# Patient Record
Sex: MALE | Race: BLACK OR AFRICAN AMERICAN | NOT HISPANIC OR LATINO | Employment: FULL TIME | URBAN - METROPOLITAN AREA
[De-identification: names, ages, dates, MRNs, and addresses within clinical notes are randomized per-mention and may not be internally consistent; named-entity substitution may affect disease eponyms.]

---

## 2022-11-03 DIAGNOSIS — R79.89 ELEVATED LFTS: Primary | ICD-10-CM

## 2022-11-03 DIAGNOSIS — K76.0 HEPATIC STEATOSIS: ICD-10-CM

## 2023-02-03 ENCOUNTER — OFFICE VISIT (OUTPATIENT)
Dept: GASTROENTEROLOGY | Facility: CLINIC | Age: 33
End: 2023-02-03
Payer: MEDICAID

## 2023-02-03 VITALS
RESPIRATION RATE: 16 BRPM | WEIGHT: 235 LBS | HEIGHT: 74 IN | DIASTOLIC BLOOD PRESSURE: 83 MMHG | BODY MASS INDEX: 30.16 KG/M2 | HEART RATE: 82 BPM | TEMPERATURE: 98 F | SYSTOLIC BLOOD PRESSURE: 125 MMHG | OXYGEN SATURATION: 98 %

## 2023-02-03 DIAGNOSIS — K76.0 HEPATIC STEATOSIS: ICD-10-CM

## 2023-02-03 DIAGNOSIS — R79.89 ELEVATED LFTS: ICD-10-CM

## 2023-02-03 PROCEDURE — 1159F PR MEDICATION LIST DOCUMENTED IN MEDICAL RECORD: ICD-10-PCS | Mod: CPTII,,, | Performed by: NURSE PRACTITIONER

## 2023-02-03 PROCEDURE — 3008F PR BODY MASS INDEX (BMI) DOCUMENTED: ICD-10-PCS | Mod: CPTII,,, | Performed by: NURSE PRACTITIONER

## 2023-02-03 PROCEDURE — 99204 OFFICE O/P NEW MOD 45 MIN: CPT | Mod: S$PBB,,, | Performed by: NURSE PRACTITIONER

## 2023-02-03 PROCEDURE — 1160F RVW MEDS BY RX/DR IN RCRD: CPT | Mod: CPTII,,, | Performed by: NURSE PRACTITIONER

## 2023-02-03 PROCEDURE — 3074F SYST BP LT 130 MM HG: CPT | Mod: CPTII,,, | Performed by: NURSE PRACTITIONER

## 2023-02-03 PROCEDURE — 1160F PR REVIEW ALL MEDS BY PRESCRIBER/CLIN PHARMACIST DOCUMENTED: ICD-10-PCS | Mod: CPTII,,, | Performed by: NURSE PRACTITIONER

## 2023-02-03 PROCEDURE — 99204 PR OFFICE/OUTPT VISIT, NEW, LEVL IV, 45-59 MIN: ICD-10-PCS | Mod: S$PBB,,, | Performed by: NURSE PRACTITIONER

## 2023-02-03 PROCEDURE — 3008F BODY MASS INDEX DOCD: CPT | Mod: CPTII,,, | Performed by: NURSE PRACTITIONER

## 2023-02-03 PROCEDURE — 3079F PR MOST RECENT DIASTOLIC BLOOD PRESSURE 80-89 MM HG: ICD-10-PCS | Mod: CPTII,,, | Performed by: NURSE PRACTITIONER

## 2023-02-03 PROCEDURE — 1159F MED LIST DOCD IN RCRD: CPT | Mod: CPTII,,, | Performed by: NURSE PRACTITIONER

## 2023-02-03 PROCEDURE — 3074F PR MOST RECENT SYSTOLIC BLOOD PRESSURE < 130 MM HG: ICD-10-PCS | Mod: CPTII,,, | Performed by: NURSE PRACTITIONER

## 2023-02-03 PROCEDURE — 3079F DIAST BP 80-89 MM HG: CPT | Mod: CPTII,,, | Performed by: NURSE PRACTITIONER

## 2023-02-03 PROCEDURE — 99214 OFFICE O/P EST MOD 30 MIN: CPT | Mod: PBBFAC | Performed by: NURSE PRACTITIONER

## 2023-02-03 RX ORDER — ATORVASTATIN CALCIUM 10 MG/1
10 TABLET, FILM COATED ORAL DAILY
COMMUNITY
Start: 2022-12-19

## 2023-02-03 RX ORDER — LISINOPRIL AND HYDROCHLOROTHIAZIDE 12.5; 2 MG/1; MG/1
1 TABLET ORAL 2 TIMES DAILY
COMMUNITY
Start: 2022-12-20

## 2023-02-03 NOTE — ASSESSMENT & PLAN NOTE
Laboratory results August 3, 2022 revealed triglycerides 196.0 and otherwise unremarkable lipid panel; hemoglobin A1c 5.5%; , AST 54, chloride 92, CO2 31.0, globulin 3.6, sodium 132, total protein 8.6, and otherwise unremarkable CMP.   Acute viral hepatitis panel was negative October 28, 2022.    , AST 81, and otherwise unremarkable hepatic function panel October 28, 2022.    Abdominal ultrasound August 12, 2022 revealed hepatomegaly and mild hepatic steatosis with normal exam of the gallbladder.  Lifestyle and dietary modifications provided  Recommend good control of comorbidities  Will proceed with further workup for elevated LFTs (see below)  Call with updates  F/u clinic visit with NP in 6 months

## 2023-02-03 NOTE — PROGRESS NOTES
Subjective:       Patient ID: Mary Zhang is a 32 y.o. male.    Chief Complaint: Elevated Hepatic Enzymes    This 32-year-old  male with a history of hypertension and hyperlipidemia is referred for elevated LFTs.  He presents unaccompanied.  Laboratory results August 3, 2022 revealed triglycerides 196.0 and otherwise unremarkable lipid panel; hemoglobin A1c 5.5%; , AST 54, chloride 92, CO2 31.0, globulin 3.6, sodium 132, total protein 8.6, and otherwise unremarkable CMP. Acute viral hepatitis panel was negative October 28, 2022.  , AST 81, and otherwise unremarkable hepatic function panel October 28, 2022.  Abdominal ultrasound August 12, 2022 revealed hepatomegaly and mild hepatic steatosis with normal exam of the gallbladder.    He reports feeling well and denies nocturnal symptoms, appetite changes, unintentional weight loss, fever, chills, nausea, vomiting, hematemesis, odynophagia, dysphagia, acid reflux, pyrosis, belching, bloating, early satiety, or abdominal pain.  Bowel habits are described as formed stools once daily without melena, hematochezia, fecal urgency, fecal incontinence, or pain with defecation.  He denies icterus, jaundice, pruritus, confusion, headaches, vision changes, cough, shortness of breath, chest pain, abdominal/lower extremity swelling, dark-colored urine, or pale-colored stools.    He denies ever having an EGD or colonoscopy done.  He denies regular NSAID use or use of blood thinners.  He vapes daily and drinks alcohol on occasion.  He denies illicit drug use.  He denies a family history of IBD, colon polyps, or colon cancer.    Review of patient's allergies indicates:  No Known Allergies    Past Medical History:   Diagnosis Date    HLD (hyperlipidemia)     HTN (hypertension)      History reviewed. No pertinent surgical history.    Family History:   family history is not on file.    Social History:    reports that he has been smoking vaping with  nicotine. He has never used smokeless tobacco. He reports current alcohol use of about 7.0 standard drinks per week. He reports that he does not currently use drugs.    Review of Systems  Negative except as noted in the HPI.      Objective:      Physical Exam  Constitutional:       Appearance: Normal appearance.   HENT:      Head: Normocephalic.      Mouth/Throat:      Mouth: Mucous membranes are moist.   Eyes:      Extraocular Movements: Extraocular movements intact.      Conjunctiva/sclera: Conjunctivae normal.      Pupils: Pupils are equal, round, and reactive to light.   Cardiovascular:      Rate and Rhythm: Normal rate and regular rhythm.      Pulses: Normal pulses.      Heart sounds: Normal heart sounds.   Pulmonary:      Effort: Pulmonary effort is normal.      Breath sounds: Normal breath sounds.   Abdominal:      General: Bowel sounds are normal.      Palpations: Abdomen is soft.   Musculoskeletal:         General: Normal range of motion.      Cervical back: Normal range of motion and neck supple.   Skin:     General: Skin is warm and dry.   Neurological:      General: No focal deficit present.      Mental Status: He is alert and oriented to person, place, and time.   Psychiatric:         Mood and Affect: Mood normal.         Behavior: Behavior normal.         Thought Content: Thought content normal.         Judgment: Judgment normal.       Home Medications:     Current Outpatient Medications   Medication Sig    atorvastatin (LIPITOR) 10 MG tablet Take 10 mg by mouth once daily.    lisinopriL-hydrochlorothiazide (PRINZIDE,ZESTORETIC) 20-12.5 mg per tablet Take 1 tablet by mouth 2 (two) times daily.     Assessment/Plan:     Problem List Items Addressed This Visit          GI    Elevated LFTs     Laboratory results August 3, 2022 revealed triglycerides 196.0 and otherwise unremarkable lipid panel; hemoglobin A1c 5.5%; , AST 54, chloride 92, CO2 31.0, globulin 3.6, sodium 132, total protein 8.6, and  otherwise unremarkable CMP.   Acute viral hepatitis panel was negative October 28, 2022.    , AST 81, and otherwise unremarkable hepatic function panel October 28, 2022.    Abdominal ultrasound August 12, 2022 revealed hepatomegaly and mild hepatic steatosis with normal exam of the gallbladder.  Lifestyle and dietary modifications provided  Recommend good control of comorbidities  Will proceed with further workup for elevated LFTs (see below)  Call with updates  F/u clinic visit with NP in 6 months         Relevant Orders    Actin (Smooth Muscle) Antibody (IgG)    Alpha-1-Antitrypsin    Anti-Liver, Kidney, Microsome Ab    Antimitochondrial Antibody    ANTINUCLEAR ANTIBODIES    APTT    CBC Auto Differential    Ceruloplasmin    Comprehensive Metabolic Panel    Ferritin    US Abdomen Limited    Tissue Transglutaminase, IgA    Protime-INR    Iron and TIBC    HARDY Fibrosure    Hepatic steatosis     See above         Relevant Orders    Actin (Smooth Muscle) Antibody (IgG)    Alpha-1-Antitrypsin    Anti-Liver, Kidney, Microsome Ab    Antimitochondrial Antibody    ANTINUCLEAR ANTIBODIES    APTT    CBC Auto Differential    Ceruloplasmin    Comprehensive Metabolic Panel    Ferritin    US Abdomen Limited    Tissue Transglutaminase, IgA    Protime-INR    Iron and TIBC    HARDY Fibrosure

## 2023-02-15 ENCOUNTER — HOSPITAL ENCOUNTER (OUTPATIENT)
Dept: RADIOLOGY | Facility: HOSPITAL | Age: 33
Discharge: HOME | End: 2023-02-15
Attending: NURSE PRACTITIONER
Payer: MEDICAID

## 2023-02-15 DIAGNOSIS — K76.0 HEPATIC STEATOSIS: ICD-10-CM

## 2023-02-15 DIAGNOSIS — R79.89 ELEVATED LFTS: ICD-10-CM

## 2023-02-15 PROCEDURE — 76705 ECHO EXAM OF ABDOMEN: CPT | Mod: TC

## 2023-02-23 ENCOUNTER — TELEPHONE (OUTPATIENT)
Dept: GASTROENTEROLOGY | Facility: CLINIC | Age: 33
End: 2023-02-23
Payer: MEDICAID

## 2023-03-06 ENCOUNTER — TELEPHONE (OUTPATIENT)
Dept: GASTROENTEROLOGY | Facility: CLINIC | Age: 33
End: 2023-03-06
Payer: MEDICAID

## 2023-07-27 ENCOUNTER — LAB VISIT (OUTPATIENT)
Dept: LAB | Facility: HOSPITAL | Age: 33
End: 2023-07-27
Attending: NURSE PRACTITIONER
Payer: MEDICAID

## 2023-07-27 DIAGNOSIS — K76.0 HEPATIC STEATOSIS: ICD-10-CM

## 2023-07-27 DIAGNOSIS — R79.89 ELEVATED LFTS: ICD-10-CM

## 2023-07-27 LAB
ALBUMIN SERPL-MCNC: 4.4 G/DL (ref 3.5–5)
ALBUMIN/GLOB SERPL: 1.2 RATIO (ref 1.1–2)
ALP SERPL-CCNC: 83 UNIT/L (ref 40–150)
ALT SERPL-CCNC: 96 UNIT/L (ref 0–55)
APTT PPP: 25.3 SECONDS
AST SERPL-CCNC: 35 UNIT/L (ref 5–34)
BASOPHILS # BLD AUTO: 0.04 X10(3)/MCL
BASOPHILS NFR BLD AUTO: 0.6 %
BILIRUBIN DIRECT+TOT PNL SERPL-MCNC: 0.7 MG/DL
BUN SERPL-MCNC: 11.1 MG/DL (ref 8.9–20.6)
CALCIUM SERPL-MCNC: 9.8 MG/DL (ref 8.4–10.2)
CHLORIDE SERPL-SCNC: 102 MMOL/L (ref 98–107)
CO2 SERPL-SCNC: 27 MMOL/L (ref 22–29)
CREAT SERPL-MCNC: 1.11 MG/DL (ref 0.73–1.18)
EOSINOPHIL # BLD AUTO: 0.07 X10(3)/MCL (ref 0–0.9)
EOSINOPHIL NFR BLD AUTO: 1 %
ERYTHROCYTE [DISTWIDTH] IN BLOOD BY AUTOMATED COUNT: 13.5 % (ref 11.5–17)
FERRITIN SERPL-MCNC: 658.45 NG/ML (ref 21.81–274.66)
GFR SERPLBLD CREATININE-BSD FMLA CKD-EPI: >60 MLS/MIN/1.73/M2
GLOBULIN SER-MCNC: 3.7 GM/DL (ref 2.4–3.5)
GLUCOSE SERPL-MCNC: 115 MG/DL (ref 74–100)
HCT VFR BLD AUTO: 41.1 % (ref 42–52)
HGB BLD-MCNC: 14.2 G/DL (ref 14–18)
IMM GRANULOCYTES # BLD AUTO: 0.05 X10(3)/MCL (ref 0–0.04)
IMM GRANULOCYTES NFR BLD AUTO: 0.7 %
INR PPP: 1
IRON SATN MFR SERPL: 42 % (ref 20–50)
IRON SERPL-MCNC: 105 UG/DL (ref 65–175)
LYMPHOCYTES # BLD AUTO: 2.11 X10(3)/MCL (ref 0.6–4.6)
LYMPHOCYTES NFR BLD AUTO: 29.9 %
MCH RBC QN AUTO: 30.9 PG (ref 27–31)
MCHC RBC AUTO-ENTMCNC: 34.5 G/DL (ref 33–36)
MCV RBC AUTO: 89.5 FL (ref 80–94)
MONOCYTES # BLD AUTO: 0.32 X10(3)/MCL (ref 0.1–1.3)
MONOCYTES NFR BLD AUTO: 4.5 %
NEUTROPHILS # BLD AUTO: 4.47 X10(3)/MCL (ref 2.1–9.2)
NEUTROPHILS NFR BLD AUTO: 63.3 %
NRBC BLD AUTO-RTO: 0 %
PLATELET # BLD AUTO: 381 X10(3)/MCL (ref 130–400)
PMV BLD AUTO: 9.3 FL (ref 7.4–10.4)
POTASSIUM SERPL-SCNC: 4 MMOL/L (ref 3.5–5.1)
PROT SERPL-MCNC: 8.1 GM/DL (ref 6.4–8.3)
PROTHROMBIN TIME: 13 SECONDS (ref 11.4–14)
RBC # BLD AUTO: 4.59 X10(6)/MCL (ref 4.7–6.1)
SODIUM SERPL-SCNC: 137 MMOL/L (ref 136–145)
TIBC SERPL-MCNC: 145 UG/DL (ref 69–240)
TIBC SERPL-MCNC: 250 UG/DL (ref 250–450)
TRANSFERRIN SERPL-MCNC: 213 MG/DL (ref 174–364)
WBC # SPEC AUTO: 7.06 X10(3)/MCL (ref 4.5–11.5)

## 2023-07-27 PROCEDURE — 86376 MICROSOMAL ANTIBODY EACH: CPT

## 2023-07-27 PROCEDURE — 85730 THROMBOPLASTIN TIME PARTIAL: CPT

## 2023-07-27 PROCEDURE — 83550 IRON BINDING TEST: CPT

## 2023-07-27 PROCEDURE — 86364 TISS TRNSGLTMNASE EA IG CLAS: CPT

## 2023-07-27 PROCEDURE — 86225 DNA ANTIBODY NATIVE: CPT

## 2023-07-27 PROCEDURE — 82728 ASSAY OF FERRITIN: CPT

## 2023-07-27 PROCEDURE — 82390 ASSAY OF CERULOPLASMIN: CPT

## 2023-07-27 PROCEDURE — 85025 COMPLETE CBC W/AUTO DIFF WBC: CPT

## 2023-07-27 PROCEDURE — 36415 COLL VENOUS BLD VENIPUNCTURE: CPT

## 2023-07-27 PROCEDURE — 80053 COMPREHEN METABOLIC PANEL: CPT

## 2023-07-27 PROCEDURE — 82103 ALPHA-1-ANTITRYPSIN TOTAL: CPT

## 2023-07-27 PROCEDURE — 86381 MITOCHONDRIAL ANTIBODY EACH: CPT

## 2023-07-27 PROCEDURE — 0003M LIVER DIS 10 ASSAYS W/NASH: CPT

## 2023-07-27 PROCEDURE — 85610 PROTHROMBIN TIME: CPT

## 2023-07-27 PROCEDURE — 83516 IMMUNOASSAY NONANTIBODY: CPT

## 2023-07-28 ENCOUNTER — TELEPHONE (OUTPATIENT)
Dept: GASTROENTEROLOGY | Facility: CLINIC | Age: 33
End: 2023-07-28
Payer: MEDICAID

## 2023-07-28 DIAGNOSIS — R79.89 ELEVATED FERRITIN LEVEL: Primary | ICD-10-CM

## 2023-07-28 LAB
A1AT SERPL NEPH-MCNC: 102 MG/DL (ref 100–190)
CERULOPLASMIN SERPL-MCNC: 23.7 MG/DL (ref 19–31)
MITOCHONDRIA M2 AB SER IA-ACNC: <0.1 U
TTG IGA SER IA-ACNC: <2 U/ML

## 2023-07-28 NOTE — PROGRESS NOTES
Please notify further workup for elevated LFTs unremarkable thus far aside from elevated ferritin.  For this, I have added HFE testing and he can have this drawn at his convenience.  I will follow-up remaining laboratory results once received and reviewed.  Thanks

## 2023-07-28 NOTE — TELEPHONE ENCOUNTER
Pt notified of results and recommendations, verbalized understanding. Pt states he will labs done on 8/3/23 when he is here for appt.    ----- Message from NIRMALA Moreno sent at 7/28/2023  1:13 PM CDT -----  Please notify further workup for elevated LFTs unremarkable thus far aside from elevated ferritin.  For this, I have added HFE testing and he can have this drawn at his convenience.  I will follow-up remaining laboratory results once received and reviewed.  Thanks

## 2023-07-29 LAB — LKM-1 IGG SER IA-ACNC: 1.3 U

## 2023-07-30 LAB
ANTINUCLEAR ANTIBODY SCREEN (OHS): NEGATIVE
DSDNA AB QUANT (OHS): 2.4 IU/ML

## 2023-07-31 LAB
A2 MACROGLOB SERPL-MCNC: 98 MG/DL (ref 100–280)
ALT SERPL W P-5'-P-CCNC: 111 U/L (ref 7–55)
ANNOTATION COMMENT IMP: ABNORMAL
APO A-I SERPL-MCNC: 153 MG/DL
AST SERPL W P-5'-P-CCNC: 43 U/L (ref 8–48)
BILIRUB SERPL-MCNC: 0.7 MG/DL
CHOLEST SERPL-MCNC: 175 MG/DL
FIBROSIS STAGE SERPL QL: ABNORMAL
GGT SERPL-CCNC: 285 U/L (ref 8–61)
GLUCOSE P FAST SERPL-MCNC: 115 MG/DL (ref 70–100)
HAPTOGLOB SERPL NEPH-MCNC: 131 MG/DL (ref 30–200)
LIVER FIBR SCORE SERPL CALC.FIBROSURE: 0.12
LIVER FIBROSIS INTERPRETATION SER-IMP: ABNORMAL
LIVER STEATOSIS GRADE SERPL QL: ABNORMAL
LIVER STEATOSIS INTERP SERPL-IMP: ABNORMAL
LIVER STEATOSIS SCORE SERPL: 0.82
NASH GRADE SERPL QL: ABNORMAL
NASH INTERPRETATION SERPL-IMP: ABNORMAL
NASH SCORE SERPL: 0.6
SERIAL #: ABNORMAL
SMA IGG SER-ACNC: 8.3 U
TRIGL SERPL-MCNC: 120 MG/DL

## 2023-07-31 NOTE — PROGRESS NOTES
Please notify FibroSure with findings of moderate HARDY, moderate to severe steatosis, and no fibrosis.  Please schedule patient for FibroScan.  Further workup for elevated LFTs unremarkable thus far.  Thanks

## 2023-08-01 ENCOUNTER — TELEPHONE (OUTPATIENT)
Dept: GASTROENTEROLOGY | Facility: CLINIC | Age: 33
End: 2023-08-01
Payer: MEDICAID

## 2023-08-01 NOTE — TELEPHONE ENCOUNTER
Results to pt, verbalized understanding. FibroScan scheduled 8/11/23 2:15 pm at pt request.    ----- Message from NIRMALA Moreno sent at 7/31/2023  1:29 PM CDT -----  Please notify FibroSure with findings of moderate HARDY, moderate to severe steatosis, and no fibrosis.  Please schedule patient for FibroScan.  Further workup for elevated LFTs unremarkable thus far.  Thanks

## 2023-08-03 ENCOUNTER — OFFICE VISIT (OUTPATIENT)
Dept: GASTROENTEROLOGY | Facility: CLINIC | Age: 33
End: 2023-08-03
Payer: MEDICAID

## 2023-08-03 VITALS
BODY MASS INDEX: 30.03 KG/M2 | WEIGHT: 234 LBS | TEMPERATURE: 98 F | DIASTOLIC BLOOD PRESSURE: 77 MMHG | HEIGHT: 74 IN | RESPIRATION RATE: 16 BRPM | HEART RATE: 91 BPM | SYSTOLIC BLOOD PRESSURE: 114 MMHG | OXYGEN SATURATION: 97 %

## 2023-08-03 DIAGNOSIS — K76.0 HEPATIC STEATOSIS: ICD-10-CM

## 2023-08-03 DIAGNOSIS — R79.89 ELEVATED LFTS: Primary | ICD-10-CM

## 2023-08-03 PROCEDURE — 4010F PR ACE/ARB THEARPY RXD/TAKEN: ICD-10-PCS | Mod: CPTII,,, | Performed by: NURSE PRACTITIONER

## 2023-08-03 PROCEDURE — 3074F PR MOST RECENT SYSTOLIC BLOOD PRESSURE < 130 MM HG: ICD-10-PCS | Mod: CPTII,,, | Performed by: NURSE PRACTITIONER

## 2023-08-03 PROCEDURE — 3008F PR BODY MASS INDEX (BMI) DOCUMENTED: ICD-10-PCS | Mod: CPTII,,, | Performed by: NURSE PRACTITIONER

## 2023-08-03 PROCEDURE — 3074F SYST BP LT 130 MM HG: CPT | Mod: CPTII,,, | Performed by: NURSE PRACTITIONER

## 2023-08-03 PROCEDURE — 1159F MED LIST DOCD IN RCRD: CPT | Mod: CPTII,,, | Performed by: NURSE PRACTITIONER

## 2023-08-03 PROCEDURE — 4010F ACE/ARB THERAPY RXD/TAKEN: CPT | Mod: CPTII,,, | Performed by: NURSE PRACTITIONER

## 2023-08-03 PROCEDURE — 99214 OFFICE O/P EST MOD 30 MIN: CPT | Mod: PBBFAC | Performed by: NURSE PRACTITIONER

## 2023-08-03 PROCEDURE — 99214 PR OFFICE/OUTPT VISIT, EST, LEVL IV, 30-39 MIN: ICD-10-PCS | Mod: S$PBB,,, | Performed by: NURSE PRACTITIONER

## 2023-08-03 PROCEDURE — 3078F PR MOST RECENT DIASTOLIC BLOOD PRESSURE < 80 MM HG: ICD-10-PCS | Mod: CPTII,,, | Performed by: NURSE PRACTITIONER

## 2023-08-03 PROCEDURE — 3078F DIAST BP <80 MM HG: CPT | Mod: CPTII,,, | Performed by: NURSE PRACTITIONER

## 2023-08-03 PROCEDURE — 3008F BODY MASS INDEX DOCD: CPT | Mod: CPTII,,, | Performed by: NURSE PRACTITIONER

## 2023-08-03 PROCEDURE — 1160F PR REVIEW ALL MEDS BY PRESCRIBER/CLIN PHARMACIST DOCUMENTED: ICD-10-PCS | Mod: CPTII,,, | Performed by: NURSE PRACTITIONER

## 2023-08-03 PROCEDURE — 1159F PR MEDICATION LIST DOCUMENTED IN MEDICAL RECORD: ICD-10-PCS | Mod: CPTII,,, | Performed by: NURSE PRACTITIONER

## 2023-08-03 PROCEDURE — 99214 OFFICE O/P EST MOD 30 MIN: CPT | Mod: S$PBB,,, | Performed by: NURSE PRACTITIONER

## 2023-08-03 PROCEDURE — 1160F RVW MEDS BY RX/DR IN RCRD: CPT | Mod: CPTII,,, | Performed by: NURSE PRACTITIONER

## 2023-08-03 NOTE — ASSESSMENT & PLAN NOTE
Laboratory results August 3, 2022 revealed triglycerides 196.0 and otherwise unremarkable lipid panel; hemoglobin A1c 5.5%; , AST 54, chloride 92, CO2 31.0, globulin 3.6, sodium 132, total protein 8.6, and otherwise unremarkable CMP.   Acute viral hepatitis panel was negative October 28, 2022.    , AST 81, and otherwise unremarkable hepatic function panel October 28, 2022.    Abdominal ultrasound August 12, 2022 revealed hepatomegaly and mild hepatic steatosis with normal exam of the gallbladder.  Abdominal ultrasound February 15, 2023 revealed mildly echogenic liver may be related to mild steatosis or technique.  Further workup for elevated LFTs was unremarkable July 27, 2023.    FibroSure revealed moderate HARDY, moderate to severe steatosis, and no fibrosis.    FibroScan scheduled August 11, 2023.  Lifestyle and dietary modifications provided  Recommend good control of comorbidities  Call with updates  F/u clinic visit with NP in 6 months

## 2023-08-03 NOTE — PROGRESS NOTES
Subjective:       Patient ID: Mary Zhang is a 33 y.o. male.    Chief Complaint: Hepatic Steatosis (No complaints.)    This 33-year-old  male with a history of hypertension and hyperlipidemia presents unaccompanied for a follow-up visit.  He was initially referred for elevated LFTs and seen February 3, 2023.  Laboratory results August 3, 2022 revealed triglycerides 196.0 and otherwise unremarkable lipid panel; hemoglobin A1c 5.5%; , AST 54, chloride 92, CO2 31.0, globulin 3.6, sodium 132, total protein 8.6, and otherwise unremarkable CMP. Acute viral hepatitis panel was negative October 28, 2022.  , AST 81, and otherwise unremarkable hepatic function panel October 28, 2022.  Abdominal ultrasound August 12, 2022 revealed hepatomegaly and mild hepatic steatosis with normal exam of the gallbladder.     He reported feeling well and denied nocturnal symptoms, appetite changes, unintentional weight loss, fever, chills, nausea, vomiting, hematemesis, odynophagia, dysphagia, acid reflux, pyrosis, belching, bloating, early satiety, or abdominal pain.  Bowel habits were described as formed stools once daily without melena, hematochezia, fecal urgency, fecal incontinence, or pain with defecation.  He denies icterus, jaundice, pruritus, confusion, headaches, vision changes, cough, shortness of breath, chest pain, abdominal/lower extremity swelling, dark-colored urine, or pale-colored stools.    Abdominal ultrasound February 15, 2023 revealed mildly echogenic liver may be related to mild steatosis or technique.    Further workup for elevated LFTs was unremarkable July 27, 2023.  FibroSure revealed moderate HARDY, moderate to severe steatosis, and no fibrosis.  FibroScan was recommended and is scheduled August 11, 2023.    Today, he presents for a follow-up visit and reports continuing to feel well without any change in symptoms from previous office visit.     He denies ever having an EGD or  colonoscopy done.  He denies regular NSAID use or use of blood thinners.  He vapes daily and reports alcohol cessation since last office visit.  He denies illicit drug use.  He denies a family history of IBD, colon polyps, or colon cancer.      Review of patient's allergies indicates:  No Known Allergies    Past Medical History:   Diagnosis Date    HLD (hyperlipidemia)     HTN (hypertension)     Liver disease      History reviewed. No pertinent surgical history.    Family History:   family history is not on file.    Social History:    reports that he has been smoking vaping with nicotine. He has never used smokeless tobacco. He reports that he does not currently use alcohol after a past usage of about 7.0 standard drinks of alcohol per week. He reports that he does not currently use drugs.    Review of Systems  Negative except as noted in the HPI.      Objective:      Physical Exam  Constitutional:       Appearance: Normal appearance.   HENT:      Head: Normocephalic.      Mouth/Throat:      Mouth: Mucous membranes are moist.   Eyes:      Extraocular Movements: Extraocular movements intact.      Conjunctiva/sclera: Conjunctivae normal.      Pupils: Pupils are equal, round, and reactive to light.   Cardiovascular:      Rate and Rhythm: Normal rate and regular rhythm.      Pulses: Normal pulses.      Heart sounds: Normal heart sounds.   Pulmonary:      Effort: Pulmonary effort is normal.      Breath sounds: Normal breath sounds.   Abdominal:      General: Bowel sounds are normal.      Palpations: Abdomen is soft.   Musculoskeletal:         General: Normal range of motion.      Cervical back: Normal range of motion and neck supple.   Skin:     General: Skin is warm and dry.   Neurological:      General: No focal deficit present.      Mental Status: He is alert and oriented to person, place, and time.   Psychiatric:         Mood and Affect: Mood normal.         Behavior: Behavior normal.         Thought Content: Thought  content normal.         Judgment: Judgment normal.         Home Medications:     Current Outpatient Medications   Medication Sig    atorvastatin (LIPITOR) 10 MG tablet Take 10 mg by mouth once daily.    lisinopriL-hydrochlorothiazide (PRINZIDE,ZESTORETIC) 20-12.5 mg per tablet Take 1 tablet by mouth 2 (two) times daily.     Laboratory Results:     Recent Results (from the past 2016 hour(s))   Actin (Smooth Muscle) Antibody (IgG)    Collection Time: 07/27/23 11:43 AM   Result Value Ref Range    F-Actin Ab, IgG 8.3 <20.0 (Negative) U   Alpha-1-Antitrypsin    Collection Time: 07/27/23 11:43 AM   Result Value Ref Range    Alpha-1-Antitrypsin, S 102 100 - 190 mg/dL   Anti-Liver, Kidney, Microsome Ab    Collection Time: 07/27/23 11:43 AM   Result Value Ref Range    Liver-Kid Microsome-1 Ab, IgG by KENJI 1.3 0.0 - 24.9 U   Antimitochondrial Antibody    Collection Time: 07/27/23 11:43 AM   Result Value Ref Range    Mitochondrial Ab, M2, S <0.1 <0.1 (Negative) U   ANTINUCLEAR ANTIBODIES    Collection Time: 07/27/23 11:43 AM   Result Value Ref Range    NEELAM Screen Negative Negative    DSDNA Ab Quant 2.4 <10.0 IU/mL   APTT    Collection Time: 07/27/23 11:43 AM   Result Value Ref Range    PTT 25.3 <=149.9 seconds   Ceruloplasmin    Collection Time: 07/27/23 11:43 AM   Result Value Ref Range    Ceruloplasmin, S 23.7 19.0 - 31.0 mg/dL   Comprehensive Metabolic Panel    Collection Time: 07/27/23 11:43 AM   Result Value Ref Range    Sodium Level 137 136 - 145 mmol/L    Potassium Level 4.0 3.5 - 5.1 mmol/L    Chloride 102 98 - 107 mmol/L    Carbon Dioxide 27 22 - 29 mmol/L    Glucose Level 115 (H) 74 - 100 mg/dL    Blood Urea Nitrogen 11.1 8.9 - 20.6 mg/dL    Creatinine 1.11 0.73 - 1.18 mg/dL    Calcium Level Total 9.8 8.4 - 10.2 mg/dL    Protein Total 8.1 6.4 - 8.3 gm/dL    Albumin Level 4.4 3.5 - 5.0 g/dL    Globulin 3.7 (H) 2.4 - 3.5 gm/dL    Albumin/Globulin Ratio 1.2 1.1 - 2.0 ratio    Bilirubin Total 0.7 <=1.5 mg/dL    Alkaline  Phosphatase 83 40 - 150 unit/L    Alanine Aminotransferase 96 (H) 0 - 55 unit/L    Aspartate Aminotransferase 35 (H) 5 - 34 unit/L    eGFR >60 mls/min/1.73/m2   Ferritin    Collection Time: 07/27/23 11:43 AM   Result Value Ref Range    Ferritin Level 658.45 (H) 21.81 - 274.66 ng/mL   Tissue Transglutaminase, IgA    Collection Time: 07/27/23 11:43 AM   Result Value Ref Range    Tissue Transglutaminase (tTG) Ab, IgA <2 0 - 3 U/mL   Protime-INR    Collection Time: 07/27/23 11:43 AM   Result Value Ref Range    PT 13.0 11.4 - 14.0 seconds    INR 1.0 <=1.3   Iron and TIBC    Collection Time: 07/27/23 11:43 AM   Result Value Ref Range    Iron Binding Capacity Unsaturated 145 69 - 240 ug/dL    Iron Level 105 65 - 175 ug/dL    Transferrin 213 174 - 364 mg/dL    Iron Binding Capacity Total 250 250 - 450 ug/dL    Iron Saturation 42 20 - 50 %   HARDY Fibrosure    Collection Time: 07/27/23 11:43 AM   Result Value Ref Range    NashTest 2 Score 0.60     NashTest 2 Grade N2     NashTest 2 Interpretation moderate HARDY     SteatoTest 2 Score 0.82     SteatoTest 2 Grade S2S3     SteatoTest 2 Interpretation SEE COMMENTS     HARDY-FibroTest Comment SEE COMMENTS     Aspartate Aminotransferase (AST), S 43 8 - 48 U/L    Cholesterol, Total, S 175 mg/dL    Triglycerides, S 120 mg/dL    Glucose, Fasting, P 115 (H) 70 - 100 mg/dL    FibroTest Score 0.12     FibroTest Stage F0     FibroTest Interpretation no fibrosis     BioPredictive Serial Number [id number]     Apolipoprotein A1 153 >=120 mg/dL    Alpha-2-Macroglobulin 98 (L) 100 - 280 mg/dL    Haptoglobin 131 30 - 200 mg/dL    Alanine Aminotransferase (ALT) 111 (H) 7 - 55 U/L    Gamma Glutamyltransferase (GGT) 285 (H) 8 - 61 U/L    Bilirubin, Total 0.7 <=1.2 mg/dL   CBC with Differential    Collection Time: 07/27/23 11:43 AM   Result Value Ref Range    WBC 7.06 4.50 - 11.50 x10(3)/mcL    RBC 4.59 (L) 4.70 - 6.10 x10(6)/mcL    Hgb 14.2 14.0 - 18.0 g/dL    Hct 41.1 (L) 42.0 - 52.0 %    MCV 89.5  80.0 - 94.0 fL    MCH 30.9 27.0 - 31.0 pg    MCHC 34.5 33.0 - 36.0 g/dL    RDW 13.5 11.5 - 17.0 %    Platelet 381 130 - 400 x10(3)/mcL    MPV 9.3 7.4 - 10.4 fL    Neut % 63.3 %    Lymph % 29.9 %    Mono % 4.5 %    Eos % 1.0 %    Basophil % 0.6 %    Lymph # 2.11 0.6 - 4.6 x10(3)/mcL    Neut # 4.47 2.1 - 9.2 x10(3)/mcL    Mono # 0.32 0.1 - 1.3 x10(3)/mcL    Eos # 0.07 0 - 0.9 x10(3)/mcL    Baso # 0.04 <=0.2 x10(3)/mcL    IG# 0.05 (H) 0 - 0.04 x10(3)/mcL    IG% 0.7 %    NRBC% 0.0 %     Imaging Results:     Narrative & Impression  EXAMINATION:  US ABDOMEN LIMITED     CLINICAL HISTORY:  Other specified abnormal findings of blood chemistry     TECHNIQUE:  Limited ultrasound of the right upper quadrant of the abdomen was performed.     COMPARISON:  None     FINDINGS:  LIVER: 17.5 cm cranial caudal at the midclavicular line. Increased echogenicity with decreased penetration of the ultrasound beam. No focal mass appreciable. Portal vein is patent with appropriate directional flow.     PANCREAS: Obscured by overlying bowel gas.     GALLBLADDER: No shadowing calculi, wall thickening, or pericholecystic fluid.     BILE DUCTS: 4 mm common bile duct.  Distal common bile duct is obscured by shadowing bowel gas.     INFERIOR VENA CAVA: Normal in appearance.     RIGHT KIDNEY: 11 cm. No hydronephrosis.     OTHER: No ascites.     Impression:     Mildly echogenic liver may be related to mild steatosis or technique.      Electronically signed by: Sabine Ruff  Date:                                            02/15/2023  Time:                                           09:56    Assessment/Plan:     Problem List Items Addressed This Visit          GI    Elevated LFTs - Primary     Laboratory results August 3, 2022 revealed triglycerides 196.0 and otherwise unremarkable lipid panel; hemoglobin A1c 5.5%; , AST 54, chloride 92, CO2 31.0, globulin 3.6, sodium 132, total protein 8.6, and otherwise unremarkable CMP.   Acute viral  hepatitis panel was negative October 28, 2022.    , AST 81, and otherwise unremarkable hepatic function panel October 28, 2022.    Abdominal ultrasound August 12, 2022 revealed hepatomegaly and mild hepatic steatosis with normal exam of the gallbladder.  Abdominal ultrasound February 15, 2023 revealed mildly echogenic liver may be related to mild steatosis or technique.  Further workup for elevated LFTs was unremarkable July 27, 2023.    FibroSure revealed moderate HARDY, moderate to severe steatosis, and no fibrosis.    FibroScan scheduled August 11, 2023.  Lifestyle and dietary modifications provided  Recommend good control of comorbidities  Call with updates  F/u clinic visit with NP in 6 months         Relevant Orders    CBC Auto Differential    Comprehensive Metabolic Panel    Protime-INR    US Abdomen Limited    Hepatic steatosis     See above         Relevant Orders    CBC Auto Differential    Comprehensive Metabolic Panel    Protime-INR    US Abdomen Limited

## 2023-08-11 ENCOUNTER — PROCEDURE VISIT (OUTPATIENT)
Dept: INFECTIOUS DISEASES | Facility: CLINIC | Age: 33
End: 2023-08-11
Payer: MEDICAID

## 2023-08-11 DIAGNOSIS — K75.81 NASH (NONALCOHOLIC STEATOHEPATITIS): Primary | ICD-10-CM

## 2023-08-11 PROCEDURE — 91200 LIVER ELASTOGRAPHY: CPT | Mod: PBBFAC | Performed by: INTERNAL MEDICINE

## 2023-08-14 ENCOUNTER — HOSPITAL ENCOUNTER (OUTPATIENT)
Dept: RADIOLOGY | Facility: HOSPITAL | Age: 33
Discharge: HOME | End: 2023-08-14
Attending: NURSE PRACTITIONER
Payer: MEDICAID

## 2023-08-14 DIAGNOSIS — R79.89 ELEVATED LFTS: ICD-10-CM

## 2023-08-14 DIAGNOSIS — K76.0 HEPATIC STEATOSIS: ICD-10-CM

## 2023-08-14 PROCEDURE — 76705 ECHO EXAM OF ABDOMEN: CPT | Mod: TC

## 2023-08-14 NOTE — PROGRESS NOTES
Please notify findings reflective of mild hepatic steatosis and otherwise unremarkable exam.  Thanks

## 2023-09-01 ENCOUNTER — TELEPHONE (OUTPATIENT)
Dept: GASTROENTEROLOGY | Facility: CLINIC | Age: 33
End: 2023-09-01
Payer: MEDICAID

## 2023-09-01 NOTE — TELEPHONE ENCOUNTER
Pt notified to have outstanding labs completed at earliest convenience. Pt verbalized understanding.

## 2024-02-26 NOTE — PROCEDURES
Fibroscan Procedure     Name: Mary Zhang  Date of Procedure : 2023  Interpreting Physician: Crystal Jones MD, MPH  Diagnosis: MASLD (NAFLD)     Probe: XL    Fibroscan readin.1 kPa    Fibrosis: F 0-1     CAP readin dB/m    Steatosis: S1      Miscellaneous:

## 2024-02-27 ENCOUNTER — TELEPHONE (OUTPATIENT)
Dept: GASTROENTEROLOGY | Facility: CLINIC | Age: 34
End: 2024-02-27
Payer: MEDICAID

## 2024-03-06 ENCOUNTER — PATIENT MESSAGE (OUTPATIENT)
Dept: GASTROENTEROLOGY | Facility: CLINIC | Age: 34
End: 2024-03-06
Payer: MEDICAID